# Patient Record
(demographics unavailable — no encounter records)

---

## 2025-07-09 NOTE — HISTORY OF PRESENT ILLNESS
[FreeTextEntry1] : 73 year old male with recent changes in bowel habits with abdominal pain, symptoms were worsening which lead to a colonoscopy with Dr. Beltrán found to have an ascending/right colon cancer.

## 2025-07-09 NOTE — PHYSICAL EXAM
[Abdomen Masses] : No abdominal masses [Abdomen Tenderness] : ~T No ~M abdominal tenderness [Tender] : nontender [JVD] : no jugular venous distention  [Normal Breath Sounds] : Normal breath sounds [Normal Heart Sounds] : normal heart sounds [Normal Rate and Rhythm] : normal rate and rhythm [No Rash or Lesion] : No rash or lesion [Alert] : alert [Oriented to Person] : oriented to person [Oriented to Place] : oriented to place [Oriented to Time] : oriented to time [Calm] : calm [de-identified] : looks well NAD

## 2025-07-09 NOTE — DATA REVIEWED
[FreeTextEntry1] : colonoscopy shows a large malignant tumor of right/ascending colon. path: adenocarcinoma CT scan: no evidence of metastasis

## 2025-07-09 NOTE — ASSESSMENT
[FreeTextEntry1] : 73 year old male with an ascending/right colon cancer. recommend laparoscopic possible open right hemicolectomy with lymph node dissection. risk and benefits explained including complications with anesthesia, bleeding, infections, sepsis, dvt, pe, mi, hernias, leak from anastamosis requiring reoperations and stoma, inadvertant organ injury including hollow viscus, ureter, solid organs and neurovascuklar structures, and death. will need preop testing with cbc, bmp, lfts, cea, hba1c, pt/ptt, ekg, medical clearance, bowel prep, antibitotics and dvt prophylaxis.

## 2025-07-09 NOTE — REVIEW OF SYSTEMS
[As Noted in HPI] : as noted in HPI [Abdominal Pain] : abdominal pain [Constipation] : constipation [Melena] : meljoanna [Negative] : Heme/Lymph

## 2025-07-25 NOTE — ASSESSMENT
[FreeTextEntry1] : 73 year old male with an ascending/right colon cancer s/p laparoscopic right hemicolectomy with LN dissection on 7/14/25 with Dr. Littlejohn. Patient recovering well post-op. Advised pt he can resume regular diet, no heavy lifting for 4 more weeks.  Advised pt to schedule consultation with oncologist.  Follow-up with Dr. Littlejohn in 2 months.

## 2025-07-25 NOTE — PHYSICAL EXAM
[Normal Rate and Rhythm] : normal rate and rhythm [Calm] : calm [de-identified] : soft nontender nondistended, surgical incisions healed [de-identified] : normocephalic atraumatic [de-identified] : NAD [de-identified] : no JVD [de-identified] : normal respiratory effort [de-identified] : moving all extremities [de-identified] : warm moist [de-identified] : A&O x 3

## 2025-07-25 NOTE — PHYSICAL EXAM
[Normal Rate and Rhythm] : normal rate and rhythm [Calm] : calm [de-identified] : soft nontender nondistended, surgical incisions healed [de-identified] : normocephalic atraumatic [de-identified] : NAD [de-identified] : no JVD [de-identified] : normal respiratory effort [de-identified] : moving all extremities [de-identified] : warm moist [de-identified] : A&O x 3

## 2025-07-25 NOTE — DATA REVIEWED
[FreeTextEntry1] : 	 Rm Accession Number : [id number] Patient:     JOSH HELLER   Accession:                             [id number]  Collected Date/Time:                   7/14/2025 11:55 EDT Received Date/Time:                    7/14/2025 13:20 EDT  Surgical Pathology Report - Auth (Verified)  Specimen(s) Submitted 1  Terminal ileum with ascending and transverse colon with lymph nodes and omentum  Final Diagnosis Large bowel, ascending, transverse, terminal ileum with lymph nodes and omentum, excision: -Moderately differentiated infiltrating adenocarcinoma measuring 7.1 cm,  with very focal mucinous features -Adenocarcinoma infiltrates through the muscularis propria and into pericolonic soft tissue -35 lymph nodes are negative for metastatic carcinoma -A few lymph nodes exhibit a follicular proliferation, see comment -Lymphovascular space invasion is not identified -The surgical margins are negative - Adenomatous polyps - Acute appendicitis -Mesothelial hyperplasia Verified by: MCKAYLA PHAN M.D. (Electronic Signature) Reported on: 07/18/25 13:06 EDT, [facility], [address] [place] [address] Phone: [phone]   Fax: [phone] _________________________________________________________________   Comment A lymph node block is being sent out for consultation for a lymphoma workup.  An addendum report will be issued.  As per 11-JU-,   immunohistochemistry testing (IHC) for Mismatch Repair Proteins performed on tissue block 4A was reported to show the following result:  MLH1  (M1): Loss of nuclear expression MSH2   ([id number]):  Intact nuclear expression MSH6  (SP93):  Intact nuclear expression PMS2  (A16-4) : Loss of nuclear expression  BRAF was performed on the prior biopsy, 79-TC-, at [facility], and reported to be positive.  This case represents a current or recent malignant diagnosis and as such, a copy is being forwarded to the Tumor Registrar.     Please be reminded that all tumor registry cases must be accurately staged and tracked.  -For inpatients, please complete the tumor staging form on the patient's chart based on the clinical data which you have compiled.  -Please remind your office to respond promptly to the    Tumor Registrar's  request for patient follow-up.  All or portions of this case have undergone intradepartmental review (2,6)  Synoptic Summary 1: Colon and Rectum - Resection  Specimen Procedure:  Right hemicolectomy Tumor Tumor Site:  Ascending colon Histologic Type:   Adenocarcinoma Histologic Grade:   G2, moderately differentiated Tumor Size:  7.1 Centimeters (cm) Multiple Primary Sites:   Not applicable Tumor Extent:   Invades through muscularis propria into the pericolonic or perirectal tissue Macroscopic Tumor Perforation:   Not identified Lymphatic and / or Vascular Invasion:    Not identified Perineural Invasion:   Not identified Tumor Budding Score:   Low (0-4) Treatment Effect:   No known presurgical therapy Margins Margin Status for Invasive Carcinoma:    All margins negative for invasive carcinoma Margin Status for Non-Invasive Tumor:    All margins negative for high-grade dysplasia / intramucosal carcinoma and low-grade dysplasia Regional Lymph Nodes Regional Lymph Node Status:   All regional lymph nodes are negative for metastatic carcinoma.  A lymphoma workup is being performed on 1 block. Number of Lymph Nodes Examined:   35 Tumor Deposits:   Not identified pTNM Classification (AJCC 8th Edition) pT Category:   pT3 pN Category:   pN0  CAP [facility] 2024 Q2 Release  Clinical Information Ascending colon cancer, lymphadenopathy   Gross Description Received  fresh labeled  "terminal ileum with a ascending and transverse colon with lymph nodes and omentum"   is an extended right hemicolectomy specimen including a 6 cm long by 3.2 cm circumference segment of terminal ileum a 23.5 cm long by 6 cm to 9.2 cm circumference segment of colon with an attached 7.2 cm long by 0.5 cm to 0.8 cm diameter vermiform appendix, attached mesenteric and mesocolic fat (6.7 cm in maximum width), and attached (26.1 x 14.8 x 2 cm) and detached (11 x 8.2 x 1.8 cm) omentum. Both ends are stapled closed. The serosal surface is pink-tan and smooth with membranous adhesions and a focally puckered and firm at the ascending colon (inked blue). The terminal ileum wall thickness is 0.4 cm and the mucosa is tan, granular and focally hemorrhagic. The colon has a wall thickness of 0.5 cm. The mucosa is tan and smooth with with a 7.1 x 6.7 x 0.8 cm tan-brown, exophytic, focally tan-green and degenerative mass that is centrally ulcerated with raised and rolled borders involving the ileocecal valve, cecum, and ascending colon. The mass appears to extend onto the terminal ileal side of the ileocecal valve. The mass is 5.8 cm from the proximal margin and 13.5 cm from the distal margin. On cut section, the mass is tan-white and firm, up to 2.2 cm thick and extends through the muscularis propria and 0.2 cm in the mesocolic fat. The mesocolic margin is puckered and comes within 0.6 cm of the mass. The mass abuts the puckered serosal surface. The remainder the mucosa is remarkable for several polyps ranging from 0.2 cm to 0.7 cm in greatest dimensions and come within 5.5 cm of the distal margin. Multiple pink-tan, rubbery to slightly firm lymph nodes are identified ranging from 0.1 cm to 2.9 cm in greatest dimensions. Representative sections in 34 cassettes: 1A: Proximal margin, en face 1B: Representative distal margin, en face 1C: Appendix, bisected distal tip and representative dilated cross-sections 1D: Omentum 1E: Representative polyps 1F-1G: Mass to include serosal surface 1H: Mass to include mesocolic fat 1I-1J: Mass to include mesocolic fat and puckered mesocolic margin 1K: Mass to include mesocolonic fat, bisected section 1L-1M: Mass to include ileocecal valve, bisected section 1N-1T: One lymph node bisected in each 1U-1V: One lymph node trisected in each 1W-1X: One lymph node quadrisected in each 1Y-1AB: One lymph node bisected in each 1AC-1AE: One lymph node sectioned 1AF-1AG: Six lymph nodes in each 1AH: Multiple lymph nodes  JEREMY Cote ([facility]) 07/16/2025 09:50 AM   Disclaimer In addition to other data that may appear on the specimen containers, all labels have been inspected to confirm the presence of the patient's name and date of birth.

## 2025-07-25 NOTE — HISTORY OF PRESENT ILLNESS
[FreeTextEntry1] : 73 year old male with an ascending/right colon cancer s/p laparoscopic right hemicolectomy with LN dissection on 7/14/25 with Dr. Littlejohn. Pathology revealed moderately differentiated infiltrating adenocarcinoma measuring 7.1 cm, with very focal mucinous features with negative LNs. Patient doing well post-op. His appetite is good and having soft bowel movements.  He denied any pain.  He did received a call to schedule oncology appointment with Dr. Pugh but prefers to ask his PCP for his opinion. He has the number to call back when he's ready.

## 2025-07-25 NOTE — DATA REVIEWED
[FreeTextEntry1] : 	 Rm Accession Number : [id number] Patient:     JOSH HELLER   Accession:                             [id number]  Collected Date/Time:                   7/14/2025 11:55 EDT Received Date/Time:                    7/14/2025 13:20 EDT  Surgical Pathology Report - Auth (Verified)  Specimen(s) Submitted 1  Terminal ileum with ascending and transverse colon with lymph nodes and omentum  Final Diagnosis Large bowel, ascending, transverse, terminal ileum with lymph nodes and omentum, excision: -Moderately differentiated infiltrating adenocarcinoma measuring 7.1 cm,  with very focal mucinous features -Adenocarcinoma infiltrates through the muscularis propria and into pericolonic soft tissue -35 lymph nodes are negative for metastatic carcinoma -A few lymph nodes exhibit a follicular proliferation, see comment -Lymphovascular space invasion is not identified -The surgical margins are negative - Adenomatous polyps - Acute appendicitis -Mesothelial hyperplasia Verified by: MCKAYLA PHAN M.D. (Electronic Signature) Reported on: 07/18/25 13:06 EDT, [facility], [address] [place] [address] Phone: [phone]   Fax: [phone] _________________________________________________________________   Comment A lymph node block is being sent out for consultation for a lymphoma workup.  An addendum report will be issued.  As per 37-OE-,   immunohistochemistry testing (IHC) for Mismatch Repair Proteins performed on tissue block 4A was reported to show the following result:  MLH1  (M1): Loss of nuclear expression MSH2   ([id number]):  Intact nuclear expression MSH6  (SP93):  Intact nuclear expression PMS2  (A16-4) : Loss of nuclear expression  BRAF was performed on the prior biopsy, 87-SV-, at [facility], and reported to be positive.  This case represents a current or recent malignant diagnosis and as such, a copy is being forwarded to the Tumor Registrar.     Please be reminded that all tumor registry cases must be accurately staged and tracked.  -For inpatients, please complete the tumor staging form on the patient's chart based on the clinical data which you have compiled.  -Please remind your office to respond promptly to the    Tumor Registrar's  request for patient follow-up.  All or portions of this case have undergone intradepartmental review (2,6)  Synoptic Summary 1: Colon and Rectum - Resection  Specimen Procedure:  Right hemicolectomy Tumor Tumor Site:  Ascending colon Histologic Type:   Adenocarcinoma Histologic Grade:   G2, moderately differentiated Tumor Size:  7.1 Centimeters (cm) Multiple Primary Sites:   Not applicable Tumor Extent:   Invades through muscularis propria into the pericolonic or perirectal tissue Macroscopic Tumor Perforation:   Not identified Lymphatic and / or Vascular Invasion:    Not identified Perineural Invasion:   Not identified Tumor Budding Score:   Low (0-4) Treatment Effect:   No known presurgical therapy Margins Margin Status for Invasive Carcinoma:    All margins negative for invasive carcinoma Margin Status for Non-Invasive Tumor:    All margins negative for high-grade dysplasia / intramucosal carcinoma and low-grade dysplasia Regional Lymph Nodes Regional Lymph Node Status:   All regional lymph nodes are negative for metastatic carcinoma.  A lymphoma workup is being performed on 1 block. Number of Lymph Nodes Examined:   35 Tumor Deposits:   Not identified pTNM Classification (AJCC 8th Edition) pT Category:   pT3 pN Category:   pN0  CAP [facility] 2024 Q2 Release  Clinical Information Ascending colon cancer, lymphadenopathy   Gross Description Received  fresh labeled  "terminal ileum with a ascending and transverse colon with lymph nodes and omentum"   is an extended right hemicolectomy specimen including a 6 cm long by 3.2 cm circumference segment of terminal ileum a 23.5 cm long by 6 cm to 9.2 cm circumference segment of colon with an attached 7.2 cm long by 0.5 cm to 0.8 cm diameter vermiform appendix, attached mesenteric and mesocolic fat (6.7 cm in maximum width), and attached (26.1 x 14.8 x 2 cm) and detached (11 x 8.2 x 1.8 cm) omentum. Both ends are stapled closed. The serosal surface is pink-tan and smooth with membranous adhesions and a focally puckered and firm at the ascending colon (inked blue). The terminal ileum wall thickness is 0.4 cm and the mucosa is tan, granular and focally hemorrhagic. The colon has a wall thickness of 0.5 cm. The mucosa is tan and smooth with with a 7.1 x 6.7 x 0.8 cm tan-brown, exophytic, focally tan-green and degenerative mass that is centrally ulcerated with raised and rolled borders involving the ileocecal valve, cecum, and ascending colon. The mass appears to extend onto the terminal ileal side of the ileocecal valve. The mass is 5.8 cm from the proximal margin and 13.5 cm from the distal margin. On cut section, the mass is tan-white and firm, up to 2.2 cm thick and extends through the muscularis propria and 0.2 cm in the mesocolic fat. The mesocolic margin is puckered and comes within 0.6 cm of the mass. The mass abuts the puckered serosal surface. The remainder the mucosa is remarkable for several polyps ranging from 0.2 cm to 0.7 cm in greatest dimensions and come within 5.5 cm of the distal margin. Multiple pink-tan, rubbery to slightly firm lymph nodes are identified ranging from 0.1 cm to 2.9 cm in greatest dimensions. Representative sections in 34 cassettes: 1A: Proximal margin, en face 1B: Representative distal margin, en face 1C: Appendix, bisected distal tip and representative dilated cross-sections 1D: Omentum 1E: Representative polyps 1F-1G: Mass to include serosal surface 1H: Mass to include mesocolic fat 1I-1J: Mass to include mesocolic fat and puckered mesocolic margin 1K: Mass to include mesocolonic fat, bisected section 1L-1M: Mass to include ileocecal valve, bisected section 1N-1T: One lymph node bisected in each 1U-1V: One lymph node trisected in each 1W-1X: One lymph node quadrisected in each 1Y-1AB: One lymph node bisected in each 1AC-1AE: One lymph node sectioned 1AF-1AG: Six lymph nodes in each 1AH: Multiple lymph nodes  JEREMY Cote ([facility]) 07/16/2025 09:50 AM   Disclaimer In addition to other data that may appear on the specimen containers, all labels have been inspected to confirm the presence of the patient's name and date of birth.